# Patient Record
Sex: FEMALE | Race: BLACK OR AFRICAN AMERICAN
[De-identification: names, ages, dates, MRNs, and addresses within clinical notes are randomized per-mention and may not be internally consistent; named-entity substitution may affect disease eponyms.]

---

## 2017-03-22 NOTE — UC
Complaint Female HPI





- HPI Summary


HPI Summary: 


7 DAYS OF DYSURIA, FREQUENCY AND URGENCY. NO FEVER BUT DOES C/O SOME MILD BACK 

PAIN AND NAUSEA. WAS TAKING AZO BUT RAN OUT 2 DAYS AGO. REPORTS FREQUENT UTI - 

EVERY FEW MONTHS. 





ALSO C/O 3 DAYS OF THROBBING HA BEHIND HER EYES WITH PHOTOPHOBIA, PHONOPHOBIA. 

NO VISUAL DISTURBANCES. HAS MIGRAINE HA. USUALLY TAKES TYLENOL OR IBUPROFEN 

WITH GOOD SUCCESS BUT DID NOT HAVE ANY AT HOME. 





- History Of Current Complaint


Chief Complaint: UCGU


Stated Complaint: UTI SYMPTOMS


Time Seen by Provider: 03/22/17 16:42


Hx Obtained From: Patient


Hx Last Menstrual Period: 2/1/17


Onset/Duration: Gradual Onset, Lasting Days, Still Present


Timing: Constant


Severity Initially: Moderate


Severity Currently: Moderate


Pain Intensity: 8


Pain Scale Used: 0-10 Numeric


Character: Burning


Aggravating Factor(s): Urination


Alleviating Factor(s): Nothing


Associated Signs And Symptoms: Positive: Back Pain, Nausea.  Negative: Fever, 

Vaginal Discharge





- Allergies/Home Medications


Allergies/Adverse Reactions: 


 Allergies











Allergy/AdvReac Type Severity Reaction Status Date / Time


 


Penicillins Allergy Unknown Unknown Verified 03/22/17 15:52





   Reaction  





   Details  











Home Medications: 


 Home Medications





Sertraline* [Zoloft*]  03/22/17 [History]











PMH/Surg Hx/FS Hx/Imm Hx





- Additional Past Medical History


Additional PMH: 





SLEEP APNEA


Endocrine History Of: 


   Denies: Diabetes, Thyroid Disease, Hyperthyroidism, Hypothyroidism, 

Dyslipidemia


Cardiovascular History Of: 


   Denies: Cardiac Disorders, Hypertension, Pacemaker/ICD, Myocardial Infarction

, Congestive Heart Failure, Atrial Fibrillation, Deep Vein Thrombosis, Bleeding 

Disorders


Respiratory History Of: 


   Denies: COPD, Asthma, Bronchitis, Pneumonia, Pulmonary Embolism


GI/ History Of: 


   Denies: Gastroesophageal Reflux, Ulcer, Gastrointestinal Bleed, Gall Bladder 

Disease, Kidney Stones, Diverticulitis, Renal Disease, Urosepsis


Neurological History Of: 


   Denies: TIA, CVA, Dementia, Seizures, Migraine


Psychological History Of: Reports: Anxiety, Depression


   Denies: Bipolar Disorder, Schizophrenia, Post Traumatic Stress Disorder


Cancer History Of: 


   Denies: Lung Cancer, Colorectal Cancer, Breast Cancer, Prostate Cancer, 

Cervical Cancer


Other History Of: 


   Negative For: HIV, Hepatitis B, Hepatitis C, Anticoagulant Therapy





- Surgical History


Surgical History: Yes


Surgery Procedure, Year, and Place: Tubal ligation Nov 2014





- Family History


Known Family History: Positive: Diabetes


   Negative: Cardiac Disease, Hypertension





- Social History


Alcohol Use: Occasionally


Substance Use Type: None


Smoking Status (MU): Former Smoker





- Immunization History


Most Recent Influenza Vaccination: 2015





Review of Systems


Constitutional: Negative


Eyes: Photophobia


Respiratory: Negative


Cardiovascular: Negative


Gastrointestinal: Negative


Genitourinary: Dysuria, Frequency, Urgency


Neurological: Headache


All Other Systems Reviewed And Are Negative: Yes





Physical Exam


Triage Information Reviewed: Yes


Appearance: Well-Appearing, No Pain Distress, Well-Nourished


Vital Signs: 


 Initial Vital Signs











Temp  98.2 F   03/22/17 15:47


 


Pulse  73   03/22/17 15:47


 


Resp  12   03/22/17 15:47


 


BP  97/58   03/22/17 15:47


 


Pulse Ox  100   03/22/17 15:47











Vital Signs Reviewed: Yes


Eyes: Positive: Conjunctiva Clear, Other: - PERRL. EOMI


ENT: Positive: Hearing grossly normal


Neck: Positive: Supple, Nontender, No Lymphadenopathy


Respiratory: Positive: No respiratory distress, No accessory muscle use


Cardiovascular: Positive: Pulses Normal


Abdomen Description: Positive: Soft, Other: - MILD SUPRAPUBIC TENDERNESS.  

Negative: CVA Tenderness (R), CVA Tenderness (L), Distended, Guarding


Musculoskeletal: Positive: No Edema


Neurological: Positive: Alert


Psychological: Positive: Age Appropriate Behavior


Skin: Negative: rashes





Diagnostics





- Laboratory


Diagnostic Studies Completed/Ordered: URINE DIP SP. GR 1.015, 1+ LEUKS, TRACE 

BLOOD





 Complaint Female Dx





- Differential Dx/Diagnosis


Provider Diagnoses: 1. UTI.  2. MIGRAINE





Discharge





- Discharge Plan


Condition: Stable


Disposition: HOME


Prescriptions: 


Ibuprofen TAB* [Motrin TAB* 800 MG] 800 mg PO Q8H PRN #30 tab


 PRN Reason: Pain


Sulfamethox/Trimethoprim DS* [Bactrim /160 TAB*] 1 tab PO BID #10 tab


Patient Education Materials:  Urinary Tract Infection in Women (ED), Migraine 

Headache (ED)


Referrals: 


Audie Dawkins MD [Primary Care Provider] - If Needed


Additional Instructions: 


WE HAVE SENT YOUR URINE FOR CULTURE AND WILL CALL YOU IF WE NEED TO CHANGE YOUR 

MEDICATION.





GET ENOUGH REST AND STAY HYDRATED.

## 2017-08-26 NOTE — UC
Complaint Female HPI





- HPI Summary


HPI Summary: 





32 YEAR OLD FEMALE PRESENTS WITH URINARY FREQUENCY, URGENCY AND BACK PAIN. 





- History Of Current Complaint


Chief Complaint: UCGU


Stated Complaint: URINARY ISSUE


Time Seen by Provider: 08/24/17 15:51


Hx Obtained From: Patient


Hx Last Menstrual Period: 08/01/17


Onset/Duration: Sudden Onset


Timing: Constant


Severity Initially: Moderate


Severity Currently: Moderate


Pain Intensity: 4


Pain Scale Used: 0-10 Numeric


Character: Sharp, Burning, Cramping


Associated Signs And Symptoms: Positive: Back Pain





- Allergies/Home Medications


Allergies/Adverse Reactions: 


 Allergies











Allergy/AdvReac Type Severity Reaction Status Date / Time


 


Penicillins Allergy Unknown Unknown Verified 08/24/17 15:23





   Reaction  





   Details  














PMH/Surg Hx/FS Hx/Imm Hx


Other History Of: 


   Negative For: HIV, Hepatitis B, Hepatitis C, Anticoagulant Therapy





- Surgical History


Surgical History: Yes


Surgery Procedure, Year, and Place: Tubal ligation Nov 2014





- Family History


Known Family History: Positive: Diabetes


   Negative: Cardiac Disease, Hypertension





- Social History


Alcohol Use: Occasionally


Substance Use Type: None


Smoking Status (MU): Current Some Day Smoker





- Immunization History


Most Recent Influenza Vaccination: 2015





Review of Systems


Constitutional: Negative


Skin: Negative


Eyes: Negative


ENT: Negative


Respiratory: Negative


Cardiovascular: Negative


Gastrointestinal: Negative


Genitourinary: Dysuria, Frequency, Urgency


Motor: Negative


Neurovascular: Negative


Musculoskeletal: Negative


Neurological: Negative


Psychological: Negative


All Other Systems Reviewed And Are Negative: Yes





Physical Exam


Triage Information Reviewed: Yes


Vital Signs: 


 Initial Vital Signs











Temp  37.2 C   08/24/17 15:18


 


Pulse  75   08/24/17 15:18


 


Resp  18   08/24/17 15:18


 


BP  140/85   08/24/17 15:18


 


Pulse Ox  96   08/24/17 15:18











Eye Exam: Normal


ENT Exam: Normal


Dental Exam: Normal


Neck exam: Normal


Neck: Positive: 1


Respiratory Exam: Normal


Cardiovascular Exam: Normal


Abdominal Exam: Normal


Musculoskeletal Exam: Normal


Neurological Exam: Normal


Psychological Exam: Normal


Skin Exam: Normal





 Complaint Female Dx





- Differential Dx/Diagnosis


Provider Diagnoses: URINARY FREQUENCY.  URINARY URGENCY





Discharge





- Discharge Plan


Condition: Stable


Disposition: HOME


Prescriptions: 


Nitrofurantoin Monohyd Macro [Macrobid] 100 mg PO BID #14 cap


Patient Education Materials:  Urinary Tract Infection in Women (ED)


Referrals: 


Audie Dawkins MD [Primary Care Provider] - If Needed

## 2017-10-30 ENCOUNTER — HOSPITAL ENCOUNTER (EMERGENCY)
Dept: HOSPITAL 25 - ED | Age: 32
Discharge: HOME | End: 2017-10-30
Payer: COMMERCIAL

## 2017-10-30 VITALS — SYSTOLIC BLOOD PRESSURE: 103 MMHG | DIASTOLIC BLOOD PRESSURE: 65 MMHG

## 2017-10-30 DIAGNOSIS — R42: Primary | ICD-10-CM

## 2017-10-30 DIAGNOSIS — R50.9: ICD-10-CM

## 2017-10-30 DIAGNOSIS — Z72.0: ICD-10-CM

## 2017-10-30 DIAGNOSIS — R05: ICD-10-CM

## 2017-10-30 DIAGNOSIS — R11.2: ICD-10-CM

## 2017-10-30 LAB
ALBUMIN SERPL BCG-MCNC: 4.3 G/DL (ref 3.2–5.2)
ALP SERPL-CCNC: 72 U/L (ref 34–104)
ALT SERPL W P-5'-P-CCNC: 18 U/L (ref 7–52)
ANION GAP SERPL CALC-SCNC: 7 MMOL/L (ref 2–11)
AST SERPL-CCNC: 13 U/L (ref 13–39)
BUN SERPL-MCNC: 16 MG/DL (ref 6–24)
BUN/CREAT SERPL: 22.9 (ref 8–20)
CALCIUM SERPL-MCNC: 9.4 MG/DL (ref 8.6–10.3)
CHLORIDE SERPL-SCNC: 104 MMOL/L (ref 101–111)
GLOBULIN SER CALC-MCNC: 3.3 G/DL (ref 2–4)
GLUCOSE SERPL-MCNC: 82 MG/DL (ref 70–100)
HCO3 SERPL-SCNC: 23 MMOL/L (ref 22–32)
HCT VFR BLD AUTO: 42 % (ref 35–47)
HGB BLD-MCNC: 13.5 G/DL (ref 12–16)
LIPASE SERPL-CCNC: 18 U/L (ref 11–82)
MCH RBC QN AUTO: 27 PG (ref 27–31)
MCHC RBC AUTO-ENTMCNC: 33 G/DL (ref 31–36)
MCV RBC AUTO: 83 FL (ref 80–97)
POTASSIUM SERPL-SCNC: 3.8 MMOL/L (ref 3.5–5)
PROT SERPL-MCNC: 7.6 G/DL (ref 6.4–8.9)
RBC # BLD AUTO: 5.03 10^6/UL (ref 4–5.4)
SODIUM SERPL-SCNC: 134 MMOL/L (ref 133–145)
WBC # BLD AUTO: 5.1 10^3/UL (ref 3.5–10.8)

## 2017-10-30 PROCEDURE — 36415 COLL VENOUS BLD VENIPUNCTURE: CPT

## 2017-10-30 PROCEDURE — 99283 EMERGENCY DEPT VISIT LOW MDM: CPT

## 2017-10-30 PROCEDURE — 83605 ASSAY OF LACTIC ACID: CPT

## 2017-10-30 PROCEDURE — 80053 COMPREHEN METABOLIC PANEL: CPT

## 2017-10-30 PROCEDURE — 83690 ASSAY OF LIPASE: CPT

## 2017-10-30 PROCEDURE — 87502 INFLUENZA DNA AMP PROBE: CPT

## 2017-10-30 PROCEDURE — 85025 COMPLETE CBC W/AUTO DIFF WBC: CPT

## 2017-10-30 PROCEDURE — 86140 C-REACTIVE PROTEIN: CPT

## 2017-10-30 PROCEDURE — 96374 THER/PROPH/DIAG INJ IV PUSH: CPT

## 2017-10-30 PROCEDURE — 84702 CHORIONIC GONADOTROPIN TEST: CPT

## 2017-10-30 PROCEDURE — 81003 URINALYSIS AUTO W/O SCOPE: CPT

## 2017-10-30 RX ADMIN — SODIUM CHLORIDE ONE MLS/HR: 900 IRRIGANT IRRIGATION at 11:45

## 2017-10-30 NOTE — ED
Aniyah CAMARGO Edward, scribed for Deandre Chanel MD on 10/30/17 at 1105 .





Dizziness





- HPI Summary


HPI Summary: 





31 y/o female presents to the ED c/o dizziness described as mild room spinning 

and lightheadedness starting a couple of days ago. Pt also c/o eye pain, 

blurred vision, neck pain at the back of the neck, body aches, and photophobia. 

These symptoms started three days ago that worsened yesterday. The HA and 

dizziness are aggravated when she stands up. The pt states there was rhinorrhea 

and a sore throat at first that has resolved. Associated sx: subjective fever 

and chills, ear ache, N/V (1x yesterday), ABD pain described as cramps, cough. 

Pt states that when she coughs she feels something moving in her ABD in the 

periumbilical region. Denies diarrhea. LNMP last month (irregular period). Pt 

states her daughter has a mild cough at home. Sx tubal ligation.





- History Of Current Complaint


Chief Complaint: EDDizziness


Stated Complaint: BODY ACHES/DIZZY


Time Seen by Provider: 10/30/17 11:04


Hx Obtained From: Patient


Onset/Duration: Still Present


Timing: Constant


Severity Initially: Mild


Severity Currently: Moderate


Character: Lightheaded, Dizzy


Aggravating Factor(s): Supine To Erect


Alleviating Factor(s): Nothing


Associated Signs And Symptoms: Positive: Nausea, Vomiting, Unsteady Gait, Fever

, Chills, Other: - blurred vision, eye pain, neck pain, HA, sore throat, 

rhinorrhea, ear pain, ABD pain, cough, photophobia.  Negative: Diarrhea





- Allergies/Home Medications


Allergies/Adverse Reactions: 


 Allergies











Allergy/AdvReac Type Severity Reaction Status Date / Time


 


Penicillins Allergy Unknown Unknown Verified 08/24/17 15:23





   Reaction  





   Details  














PMH/Surg Hx/FS Hx/Imm Hx


Previously Healthy: No


Endocrine/Hematology History: 


   Denies: Hx Anticoagulant Therapy, Hx Diabetes, Hx Thyroid Disease


Cardiovascular History: 


   Denies: Hx Congestive Heart Failure, Hx Deep Vein Thrombosis, Hx Hypertension

, Hx Myocardial Infarction, Hx Pacemaker/ICD


Respiratory History: 


   Denies: Hx Asthma, Hx Chronic Obstructive Pulmonary Disease (COPD), Hx Lung 

Cancer, Hx Pneumonia, Hx Pulmonary Embolism


GI History: 


   Denies: Hx Gall Bladder Disease, Hx Gastrointestinal Bleed, Hx Ulcer, Hx 

Urosepsis


 History: 


   Denies: Hx Kidney Stones, Hx Renal Disease


Neurological History: 


   Denies: Hx Dementia, Hx Migraine, Hx Seizures, Hx Transient Ischemic Attacks 

(TIA)


Psychiatric History: Reports: Hx Anxiety, Hx Depression


   Denies: Hx Schizophrenia, Hx Bipolar Disorder, Hx Substance Abuse





- Surgical History


Surgery Procedure, Year, and Place: Tubal ligation Nov 2014


Infectious Disease History: No


Infectious Disease History: 


   Denies: Hx Clostridium Difficile, Hx Hepatitis, Hx Human Immunodeficiency 

Virus (HIV), Hx of Known/Suspected MRSA, Hx Shingles, Hx Tuberculosis, Hx Known/

Suspected VRE, Hx Known/Suspected VRSA, History Other Infectious Disease, 

Traveled Outside the [place] in Last 30 Days





- Family History


Known Family History: Positive: Diabetes


   Negative: Cardiac Disease, Hypertension





- Social History


Alcohol Use: Occasionally


Substance Use Type: Reports: None


Smoking Status (MU): Current Some Day Smoker





Review of Systems


Positive: Fever, Chills, Other - Body aches


Positive: Photophobia, Blurred Vision, Other - eye pain


ENT: Other - rhinorrhea


Positive: Sore Throat, Ear Ache


Cardiovascular: Negative


Positive: Cough


Positive: Abdominal Pain, Vomiting, Nausea.  Negative: Diarrhea


Genitourinary: Negative


Positive: Myalgia - neck pain


Skin: Negative


Neurological: Other - dizziness


Positive: Headache


Psychological: Normal


All Other Systems Reviewed And Are Negative: Yes





Physical Exam


Triage Information Reviewed: Yes


Vital Signs On Initial Exam: 


 Initial Vitals











Temp Pulse Resp BP Pulse Ox


 


 97.1 F   99   20   115/83   100 


 


 10/30/17 10:00  10/30/17 10:00  10/30/17 10:00  10/30/17 10:00  10/30/17 10:00











Vital Signs Reviewed: Yes


Appearance: Positive: No Pain Distress, Ill-Appearing - mildly


Skin: Positive: Warm, Skin Color Reflects Adequate Perfusion, Dry


Head/Face: Positive: Normal Head/Face Inspection


Eyes: Positive: EOMI, YUDY


ENT: Positive: Normal ENT inspection


Neck: Positive: Supple, Nontender


Respiratory/Lung Sounds: Positive: Clear to Auscultation, Breath Sounds Present


Cardiovascular: Positive: RRR


Abdomen Description: Positive: Nontender, Soft


Bowel Sounds: Positive: Present


Musculoskeletal: Positive: Normal, Strength/ROM Intact


Neurological: Positive: Normal, Sensory/Motor Intact, Alert, Oriented to Person 

Place, Time


Psychiatric: Positive: Affect/Mood Appropriate





Diagnostics





- Vital Signs


 Vital Signs











  Temp Pulse Resp BP Pulse Ox


 


 10/30/17 10:00  97.1 F  99  20  115/83  100














- Laboratory


Lab Results: 


 Lab Results











  10/30/17 10/30/17 10/30/17 Range/Units





  11:32 11:32 11:32 


 


WBC   5.1   (3.5-10.8)  10^3/ul


 


RBC   5.03   (4.0-5.4)  10^6/ul


 


Hgb   13.5   (12.0-16.0)  g/dl


 


Hct   42   (35-47)  %


 


MCV   83   (80-97)  fL


 


MCH   27   (27-31)  pg


 


MCHC   33   (31-36)  g/dl


 


RDW   14   (10.5-15)  %


 


Plt Count   286   (150-450)  10^3/ul


 


MPV   8   (7.4-10.4)  um3


 


Neut % (Auto)   57.4   (38-83)  %


 


Lymph % (Auto)   31.5   (25-47)  %


 


Mono % (Auto)   8.8   (1-9)  %


 


Eos % (Auto)   1.5   (0-6)  %


 


Baso % (Auto)   0.8   (0-2)  %


 


Absolute Neuts (auto)   2.9   (1.5-7.7)  10^3/ul


 


Absolute Lymphs (auto)   1.6   (1.0-4.8)  10^3/ul


 


Absolute Monos (auto)   0.4   (0-0.8)  10^3/ul


 


Absolute Eos (auto)   0.1   (0-0.6)  10^3/ul


 


Absolute Basos (auto)   0   (0-0.2)  10^3/ul


 


Absolute Nucleated RBC   0.01   10^3/ul


 


Nucleated RBC %   0.1   


 


Sodium  134    (133-145)  mmol/L


 


Potassium  3.8    (3.5-5.0)  mmol/L


 


Chloride  104    (101-111)  mmol/L


 


Carbon Dioxide  23    (22-32)  mmol/L


 


Anion Gap  7    (2-11)  mmol/L


 


BUN  16    (6-24)  mg/dL


 


Creatinine  0.70    (0.51-0.95)  mg/dL


 


Est GFR ( Amer)  124.7    (>60)  


 


Est GFR (Non-Af Amer)  97.0    (>60)  


 


BUN/Creatinine Ratio  22.9 H    (8-20)  


 


Glucose  82    ()  mg/dL


 


Lactic Acid    0.7  (0.5-2.0)  mmol/L


 


Calcium  9.4    (8.6-10.3)  mg/dL


 


Total Bilirubin  0.40    (0.2-1.0)  mg/dL


 


AST  13    (13-39)  U/L


 


ALT  18    (7-52)  U/L


 


Alkaline Phosphatase  72    ()  U/L


 


C-Reactive Protein  3.95    (< 5.00)  mg/L


 


Total Protein  7.6    (6.4-8.9)  g/dL


 


Albumin  4.3    (3.2-5.2)  g/dL


 


Globulin  3.3    (2-4)  g/dL


 


Albumin/Globulin Ratio  1.3    (1-3)  


 


Lipase  18    (11.0-82.0)  U/L


 


Beta HCG, Quant  < 0.60    mIU/mL


 


Urine Color     


 


Urine Appearance     


 


Urine pH     (5-9)  


 


Ur Specific Gravity     (1.010-1.030)  


 


Urine Protein     (Negative)  


 


Urine Ketones     (Negative)  


 


Urine Blood     (Negative)  


 


Urine Nitrate     (Negative)  


 


Urine Bilirubin     (Negative)  


 


Urine Urobilinogen     (Negative)  


 


Ur Leukocyte Esterase     (Negative)  


 


Urine Glucose     (Negative)  


 


Influenza A (Rapid)     (Negative)  


 


Influenza B (Rapid)     (Negative)  














  10/30/17 10/30/17 Range/Units





  12:06 13:10 


 


WBC    (3.5-10.8)  10^3/ul


 


RBC    (4.0-5.4)  10^6/ul


 


Hgb    (12.0-16.0)  g/dl


 


Hct    (35-47)  %


 


MCV    (80-97)  fL


 


MCH    (27-31)  pg


 


MCHC    (31-36)  g/dl


 


RDW    (10.5-15)  %


 


Plt Count    (150-450)  10^3/ul


 


MPV    (7.4-10.4)  um3


 


Neut % (Auto)    (38-83)  %


 


Lymph % (Auto)    (25-47)  %


 


Mono % (Auto)    (1-9)  %


 


Eos % (Auto)    (0-6)  %


 


Baso % (Auto)    (0-2)  %


 


Absolute Neuts (auto)    (1.5-7.7)  10^3/ul


 


Absolute Lymphs (auto)    (1.0-4.8)  10^3/ul


 


Absolute Monos (auto)    (0-0.8)  10^3/ul


 


Absolute Eos (auto)    (0-0.6)  10^3/ul


 


Absolute Basos (auto)    (0-0.2)  10^3/ul


 


Absolute Nucleated RBC    10^3/ul


 


Nucleated RBC %    


 


Sodium    (133-145)  mmol/L


 


Potassium    (3.5-5.0)  mmol/L


 


Chloride    (101-111)  mmol/L


 


Carbon Dioxide    (22-32)  mmol/L


 


Anion Gap    (2-11)  mmol/L


 


BUN    (6-24)  mg/dL


 


Creatinine    (0.51-0.95)  mg/dL


 


Est GFR ( Amer)    (>60)  


 


Est GFR (Non-Af Amer)    (>60)  


 


BUN/Creatinine Ratio    (8-20)  


 


Glucose    ()  mg/dL


 


Lactic Acid    (0.5-2.0)  mmol/L


 


Calcium    (8.6-10.3)  mg/dL


 


Total Bilirubin    (0.2-1.0)  mg/dL


 


AST    (13-39)  U/L


 


ALT    (7-52)  U/L


 


Alkaline Phosphatase    ()  U/L


 


C-Reactive Protein    (< 5.00)  mg/L


 


Total Protein    (6.4-8.9)  g/dL


 


Albumin    (3.2-5.2)  g/dL


 


Globulin    (2-4)  g/dL


 


Albumin/Globulin Ratio    (1-3)  


 


Lipase    (11.0-82.0)  U/L


 


Beta HCG, Quant    mIU/mL


 


Urine Color   Yellow  


 


Urine Appearance   Clear  


 


Urine pH   5.0  (5-9)  


 


Ur Specific Gravity   1.026  (1.010-1.030)  


 


Urine Protein   Negative  (Negative)  


 


Urine Ketones   Negative  (Negative)  


 


Urine Blood   Negative  (Negative)  


 


Urine Nitrate   Negative  (Negative)  


 


Urine Bilirubin   Negative  (Negative)  


 


Urine Urobilinogen   Negative  (Negative)  


 


Ur Leukocyte Esterase   Negative  (Negative)  


 


Urine Glucose   Negative  (Negative)  


 


Influenza A (Rapid)  Negative   (Negative)  


 


Influenza B (Rapid)  Negative   (Negative)  











Result Diagrams: 


 10/30/17 11:32





 10/30/17 11:32


Lab Statement: Any lab studies that have been ordered have been reviewed, and 

results considered in the medical decision making process.





Re-Evaluation





- Re-Evaluation


  ** 1


Re-Evaluation Time: 13:10


Change: Improved - Discuss test results and findings.


Comment: Discuss test results and findings. Waiting on urine





  ** 2


Re-Evaluation Time: 14:08


Comment: Discuss plan of care





Dizzy Course/Dx





- Course


Course Of Treatment: IMPROVED IN ED.  DISCUSSED RESULTS WITH PATIENT.  NO 

EVIDENCE OF BACTERIAL INFECTION AT THIS TIME THEREFORE, NO ABX RX NOW.  WILL 

TREAT SYMPTOMS.  F/U WITH PMD; RETURN IF WORSE.





- Diagnoses


Provider Diagnoses: 


 Dizziness, Lightheaded, Nausea








Discharge





- Discharge Plan


Condition: Stable


Disposition: HOME


Prescriptions: 


Ibuprofen TAB* [Motrin TAB* 600 MG] 600 mg PO Q6H PRN #20 tab


 PRN Reason: Pain


Meclizine HCl [Meclizine 25] 25 mg PO Q6H PRN #15 tab


 PRN Reason: Dizziness


Ondansetron ODT TAB* [Zofran 4 MG Odt TAB*] 4 mg PO Q6H PRN #10 tab.odt


 PRN Reason: Nausea


Patient Education Materials:  Dizziness (ED), Lightheadedness (ED), Acute 

Nausea and Vomiting (ED)


Referrals: 


Audie Dawkins MD [Primary Care Provider] - 


Additional Instructions: 


FOLLOW UP WITH YOUR DOCTOR.


RETURN TO THE EMERGENCY DEPARTMENT FOR ANY WORSENING OF YOUR CONDITION OR 

QUESTIONS OR CONCERNS.





The documentation as recorded by the Aniyah shaffer Edward accurately reflects the 

service I personally performed and the decisions made by me, Deandre Chanel MD.

## 2017-11-14 ENCOUNTER — HOSPITAL ENCOUNTER (EMERGENCY)
Dept: HOSPITAL 25 - UCEAST | Age: 32
Discharge: HOME | End: 2017-11-14
Payer: COMMERCIAL

## 2017-11-14 VITALS — SYSTOLIC BLOOD PRESSURE: 104 MMHG | DIASTOLIC BLOOD PRESSURE: 76 MMHG

## 2017-11-14 DIAGNOSIS — Z32.02: ICD-10-CM

## 2017-11-14 DIAGNOSIS — R53.83: ICD-10-CM

## 2017-11-14 DIAGNOSIS — Z88.0: ICD-10-CM

## 2017-11-14 DIAGNOSIS — Z72.0: ICD-10-CM

## 2017-11-14 DIAGNOSIS — N64.4: Primary | ICD-10-CM

## 2017-11-14 DIAGNOSIS — N64.52: ICD-10-CM

## 2017-11-14 PROCEDURE — 85027 COMPLETE CBC AUTOMATED: CPT

## 2017-11-14 PROCEDURE — 84443 ASSAY THYROID STIM HORMONE: CPT

## 2017-11-14 PROCEDURE — 36415 COLL VENOUS BLD VENIPUNCTURE: CPT

## 2017-11-14 PROCEDURE — G0463 HOSPITAL OUTPT CLINIC VISIT: HCPCS

## 2017-11-14 PROCEDURE — 99211 OFF/OP EST MAY X REQ PHY/QHP: CPT

## 2017-11-14 PROCEDURE — 84146 ASSAY OF PROLACTIN: CPT

## 2017-11-14 PROCEDURE — 84702 CHORIONIC GONADOTROPIN TEST: CPT

## 2017-11-14 PROCEDURE — 80053 COMPREHEN METABOLIC PANEL: CPT

## 2017-11-14 PROCEDURE — 81003 URINALYSIS AUTO W/O SCOPE: CPT

## 2017-11-14 NOTE — UC
Cardiac HPI





- HPI Summary


HPI Summary: 


31 y/o female with c/o b/l breast pain, tenderness, with one episode of nipple 

discharge from L breast, small drop of clear fluid x 1 week.   denies pregnancy

, tubal ligation after 3rd child.   Denies vision changes, headache.  + feeling 

tired, weak.  no PMH thyroid disease, endocrine issues,  no prior breast pain/ 

drainage before,   no fever, chills, feeling ill.   no recent surgery/ trauma. 

   + increased pain with shortness of breath on breasts, feels like "pulling" 

sensation.  





- History of Current Complaint


Hx Obtained From: Patient


Hx Last Menstrual Period: tubal


Onset/Duration: Sudden Onset, Lasting Days


Timing: Constant


Pain Intensity: 8


Aggravating Factor(s): Position, Movement


Alleviating Factor(s): Rest, Position


Associated Signs & Symptoms: Positive: Chest Pain





<Samantha Eldridge - Last Filed: 11/15/17 23:32>





<Maria T Calle - Last Filed: 11/16/17 08:23>





- History of Current Complaint


Chief Complaint: UCGeneralIllness


Stated Complaint: BREASTS PAIN


Time Seen by Provider: 11/14/17 16:57





- Allergy/Home Medications


Allergies/Adverse Reactions: 


 Allergies











Allergy/AdvReac Type Severity Reaction Status Date / Time


 


Penicillins Allergy Unknown Unknown Verified 11/14/17 16:10





   Reaction  





   Details  














PMH/Surg Hx/FS Hx/Imm Hx


Previously Healthy: Yes


Other History Of: 


   Negative For: HIV, Hepatitis B, Hepatitis C, Anticoagulant Therapy





- Surgical History


Surgical History: Yes


Surgery Procedure, Year, and Place: Tubal ligation Nov 2014





- Family History


Known Family History: Positive: Diabetes


   Negative: Cardiac Disease, Hypertension





- Social History


Alcohol Use: Occasionally


Substance Use Type: None


Smoking Status (MU): Current Some Day Smoker





- Immunization History


Most Recent Influenza Vaccination: 2015





<Samantha Eldridge - Last Filed: 11/15/17 23:32>





Review of Systems


Constitutional: Fatigue


Skin: Other


Genitourinary: Other - breast pain b/l with nipple discharge from L x 1


Is Patient Immunocompromised?: No


All Other Systems Reviewed And Are Negative: Yes





<Samantha Eldridge - Last Filed: 11/15/17 23:32>





Physical Exam


Triage Information Reviewed: Yes


Appearance: Well-Appearing, No Pain Distress, Well-Nourished


Vital Signs: 


 Initial Vital Signs











Temp  36.3 C   11/14/17 16:11


 


Pulse  87   11/14/17 16:11


 


Resp  18   11/14/17 16:11


 


BP  104/76   11/14/17 16:11


 


Pulse Ox  100   11/14/17 16:11











Vital Signs Reviewed: Yes


Eyes: Positive: Conjunctiva Clear


Neck: Positive: Supple, Nontender, No Lymphadenopathy


Respiratory: Positive: Chest non-tender, Lungs clear, Normal breath sounds, No 

respiratory distress, No accessory muscle use.  Negative: Respiratory distress, 

Decreased breath sounds, Accessory muscle use, Crackles, Rhonchi, Stridor, 

Wheezing, Expiration, Inspiration


Cardiovascular: Positive: RRR, No Murmur, Pulses Normal - upper/ lower radial, 

PT 2+ B/L


Abdomen Description: Positive: Nontender, No Organomegaly, Soft, Bruit.  

Negative: CVA Tenderness (R), CVA Tenderness (L)


Musculoskeletal: Positive: ROM Intact - b/l LEs


Skin Exam: Normal


Skin: Positive: Other - no LAD b/l axillary, breast examination b/l negative 

for masses, lesions, no open wounds, sores, no nipple discharge at baseline or 

with expression for areola.   tenderness to deep palpation of breast tissue b/

l.  no skin changes, no warmth, no induration.





<Samantha Eldridge - Last Filed: 11/15/17 23:32>


Vital Signs: 


 Initial Vital Signs











Temp  97.3 F   11/14/17 16:11


 


Pulse  87   11/14/17 16:11


 


Resp  18   11/14/17 16:11


 


BP  104/76   11/14/17 16:11


 


Pulse Ox  100   11/14/17 16:11














<Maria T Calle - Last Filed: 11/16/17 08:23>





- Assessment/Plan


Course Of Treatment: neg pregnancy blood drawn for prolactin, TSH, cbc, cmp, 

follow up with primary for imaging- mammography within 5-7 days.  jean pierre and 

discussed with DR. Calle.





- Differential Diagnoses - Chest Pain


Differential Diagnosis/HQI/PQRI: Acute MI, Angina, Aortic Aneurysm





- Clinical Impression


Provider Diagnoses: breast pain b/l.





<Samantha Eldridge - Last Filed: 11/15/17 23:32>





Discharge





<Samantha Eldridge - Last Filed: 11/15/17 23:32>





<Marai T Calle - Last Filed: 11/16/17 08:23>





- Discharge Plan


Condition: Good


Disposition: HOME


Patient Education Materials:  Nipple Discharge (ED)


Referrals: 


Audie Dawkins MD [Primary Care Provider] - 


Additional Instructions: 


- Follow up with primary physician- imaging needed to evaluate breast tissue, 

such as mammogram.  Follow up with dr within 7 days 


- Tylenol/ motrin as needed for pain 


- Blood work drawn we will call with abnormalities within 48-72 hours, please 

contact if you would like results. 


- 





ED Addendum


Addendum: 





Asked by EBER to eval pt


Pt presents with progressive bilateral breast pain and burning over 2 weeks. Pt 

states breasts feels full and "bloated" . Pt states feels similar to when was 

pregnant.  Pt states had very small amount clearish/white fliuid from left 

nipple. No recurrence. no bleeding. No skin changes. No redness. Pt states has 

not been wearing bra as breasts are sensitive. No h/o trauma.   No fever, chills

, rash. No ha, vision changes. No n/v/d.  No cp, sob, abd pain. No headache. No 

changes to menstrual cycle. No discharge, itching, odor. No h.o similar no 

other complaints.  No new lotions, product, detergents








A+Ox3, pleasant, NAD


YUDY, EOM intact and full


TM x2 clear 


mmmoist  no exudate


CTA throughout no w/r


RRR nl s1, s2  no m/r/r


abd soft + BS  no guarding no rebound








Pt with pendulous breasts


bilateral symmetric - no dimpling ,retractions of nipple or skin


Pt with dense breast tissue on exam - no masses identified.  No pain with exam


No masses or irregularities palpation to axilla  


no lymphadenopathy b/l axilla





Pt with no concerning exam without acute findings


I had a long discussion with patient regarding breast pain and differential 

causes. 


This includes but is not limited to pregnancy, hormone irregularities,thyroid, 

tumors


Will draw labs today


Pt recommended to follow closely with PCP or gyn - pt has both providers


Pt comfortable and in agreement with plan


questions answered


d/w PA 











<Maria T Calle - Last Filed: 11/16/17 08:23>





Addendum entered and electronically signed by Samantha Eldridge PA  11/15/17 23:

32: 








UC Addendum


Addendum: 





Dx bilateral breast pain, nipple discharge left

## 2017-11-15 LAB
ALBUMIN SERPL BCG-MCNC: 4.4 G/DL (ref 3.2–5.2)
ALP SERPL-CCNC: 70 U/L (ref 34–104)
ALT SERPL W P-5'-P-CCNC: 31 U/L (ref 7–52)
ANION GAP SERPL CALC-SCNC: 12 MMOL/L (ref 2–11)
AST SERPL-CCNC: 19 U/L (ref 13–39)
BUN SERPL-MCNC: 16 MG/DL (ref 6–24)
BUN/CREAT SERPL: 23.5 (ref 8–20)
CALCIUM SERPL-MCNC: 9.4 MG/DL (ref 8.6–10.3)
CHLORIDE SERPL-SCNC: 109 MMOL/L (ref 101–111)
GLOBULIN SER CALC-MCNC: 2.8 G/DL (ref 2–4)
GLUCOSE SERPL-MCNC: 116 MG/DL (ref 70–100)
HCO3 SERPL-SCNC: 18 MMOL/L (ref 22–32)
HCT VFR BLD AUTO: 39 % (ref 35–47)
HGB BLD-MCNC: 12.4 G/DL (ref 12–16)
MCH RBC QN AUTO: 27 PG (ref 27–31)
MCHC RBC AUTO-ENTMCNC: 32 G/DL (ref 31–36)
MCV RBC AUTO: 84 FL (ref 80–97)
POTASSIUM SERPL-SCNC: 4.2 MMOL/L (ref 3.5–5)
PROT SERPL-MCNC: 7.2 G/DL (ref 6.4–8.9)
RBC # BLD AUTO: 4.63 10^6/UL (ref 4–5.4)
SODIUM SERPL-SCNC: 139 MMOL/L (ref 133–145)
TSH SERPL-ACNC: 2.43 MCIU/ML (ref 0.34–5.6)
WBC # BLD AUTO: 6.2 10^3/UL (ref 3.5–10.8)

## 2017-12-07 ENCOUNTER — HOSPITAL ENCOUNTER (EMERGENCY)
Dept: HOSPITAL 25 - ED | Age: 32
LOS: 1 days | Discharge: HOME | End: 2017-12-08
Payer: COMMERCIAL

## 2017-12-07 DIAGNOSIS — Z72.0: ICD-10-CM

## 2017-12-07 DIAGNOSIS — R10.84: ICD-10-CM

## 2017-12-07 DIAGNOSIS — R11.2: ICD-10-CM

## 2017-12-07 DIAGNOSIS — N83.209: Primary | ICD-10-CM

## 2017-12-07 PROCEDURE — 99282 EMERGENCY DEPT VISIT SF MDM: CPT

## 2017-12-07 PROCEDURE — 81003 URINALYSIS AUTO W/O SCOPE: CPT

## 2017-12-07 PROCEDURE — 74176 CT ABD & PELVIS W/O CONTRAST: CPT

## 2017-12-08 VITALS — SYSTOLIC BLOOD PRESSURE: 111 MMHG | DIASTOLIC BLOOD PRESSURE: 51 MMHG

## 2017-12-08 NOTE — RAD
INDICATION: Left flank pain     



COMPARISON: None

 

TECHNIQUE: Noncontrast axial source images were acquired from the level hemidiaphragms to

the symphysis pubis as part of CT imaging for renal stone.



Lung bases: The lung bases are clear.



Liver: The liver is normal in size. Noncontrast imaging shows no evidence of a hepatic

mass or ductal dilatation.



Gallbladder: The gallbladder is partially contracted and there are multiple gallstones.

There is no pericholecystic fluid.



Spleen: The spleen is normal in size. The noncontrast CT appearance is normal.



Pancreas: Noncontrast imaging shows no pancreatic mass or ductal dilitation.



Adrenal glands: No masses are identified.



Kidneys/Bladder: There is no evidence of nephrolithiasis or CT evidence of hydronephrosis.

Noncontrast imaging shows no evidence of a renal mass.   The bladder is unremarkable..



Adenopathy: There is no evidence of intraperitoneal or retroperitoneal adenopathy.

Evaluation is limited without oral contrast.



Fluid collections: There are no free or localized fluid collections.



Vessels: The aorta and iliac vessels are normal in caliber. There are no significant

atherosclerotic changes. The IVC appears normal 



Pelvic organs: The uterus and right adnexa are normal. There is a thick walled 6.1 cm left

adnexal cyst. Suggest follow-up pelvic sonography.



GI tract: Evaluation of the bowel is limited without oral contrast. The stomach, small

bowel, and lower GI tract appear grossly normal. There is ingested material within the

stomach. There are no obstructive findings. The appendix is visualized and appears normal.



Soft tissues: There is a moderate-sized periumbilical hernia containing fat the neck of

the hernia measures 1.6 cm and the hernia sac approximately 5 x 3 cm.



Osseous structures: There are no acute osseous findings.



IMPRESSION:

1.  Partially contracted gallbladder with multiple gallstones.

2.  6.1 cm left adnexal cyst. Suggest follow-up pelvic sonography.

3.  Fat-containing periumbilical hernia.

## 2018-01-04 NOTE — ED
Tomy CAMARGO Alfonso, scribed for Marco Gauthier MD on 12/07/17 at 2351 .





Abdominal Pain/Female





- HPI Summary


HPI Summary: 


 This patient is a 32 year old F presenting to [facility] with a chief complaint of 

left flank pain radiating to stomach since 1 week ago. She visited her PCP 

today, who referred her to the ED to r/o kidney stone and ectopic pregnancy. 

The patient rates the pain 10/10 in severity. Symptoms aggravated by nothing 

Symptoms alleviated by rocking position. Patient reports N/V. Patient denies 

fevers, chills, and hematuria. Her last BM was normal. LMP beginning of last 

month. She had lab work drawn earlier today which showed a negative urinalysis, 

beta HCG, and CBC.





- History of Current Complaint


Chief Complaint: EDFlankPain


Stated Complaint: LT FLANK PAIN


Hx Obtained From: Patient


Hx Last Menstrual Period: tubal


Pregnant?: No


Onset/Duration: Gradual Onset, Lasting Weeks - 1, Still Present


Timing: Constant


Severity Currently: Severe


Pain Intensity: 10


Pain Scale Used: 0-10 Numeric


Location: Flank - L


Radiates: Yes


Radiates to: Other - "stomach"


Aggravating Factor(s): Nothing


Alleviating Factor(s): Other: - "rocking" position


Associated Signs and Symptoms: Positive: Other: - N/V. Patient denies fevers, 

chills, hematuria.


Allergies/Adverse Reactions: 


 Allergies











Allergy/AdvReac Type Severity Reaction Status Date / Time


 


Penicillins Allergy Unknown Unknown Verified 12/07/17 22:49





   Reaction  





   Details  














PMH/Surg Hx/FS Hx/Imm Hx


Endocrine/Hematology History: 


   Denies: Hx Anticoagulant Therapy, Hx Diabetes, Hx Thyroid Disease


Cardiovascular History: 


   Denies: Hx Congestive Heart Failure, Hx Deep Vein Thrombosis, Hx Hypertension

, Hx Myocardial Infarction, Hx Pacemaker/ICD


Respiratory History: 


   Denies: Hx Asthma, Hx Chronic Obstructive Pulmonary Disease (COPD), Hx Lung 

Cancer, Hx Pneumonia, Hx Pulmonary Embolism


GI History: 


   Denies: Hx Gall Bladder Disease, Hx Gastrointestinal Bleed, Hx Ulcer, Hx 

Urosepsis


 History: 


   Denies: Hx Kidney Stones, Hx Renal Disease


Neurological History: 


   Denies: Hx Dementia, Hx Migraine, Hx Seizures, Hx Transient Ischemic Attacks 

(TIA)


Psychiatric History: Reports: Hx Anxiety, Hx Depression


   Denies: Hx Schizophrenia, Hx Bipolar Disorder, Hx Substance Abuse





- Surgical History


Surgery Procedure, Year, and Place: Tubal ligation Nov 2014


Infectious Disease History: No


Infectious Disease History: 


   Denies: Hx Clostridium Difficile, Hx Hepatitis, Hx Human Immunodeficiency 

Virus (HIV), Hx of Known/Suspected MRSA, Hx Shingles, Hx Tuberculosis, Hx Known/

Suspected VRE, Hx Known/Suspected VRSA, History Other Infectious Disease, 

Traveled Outside the [place] in Last 30 Days





- Family History


Known Family History: Positive: Diabetes


   Negative: Cardiac Disease, Hypertension





- Social History


Alcohol Use: Occasionally


Hx Substance Use: No


Substance Use Type: Reports: None


Hx Tobacco Use: Yes


Smoking Status (MU): Current Some Day Smoker





Review of Systems


Negative: Fever, Chills


Positive: Abdominal Pain, Vomiting, Nausea


Negative: hematuria


All Other Systems Reviewed And Are Negative: Yes





Physical Exam





- Summary


Physical Exam Summary: 





Appearance: Well-appearing, Well-nourished, obese, distress but pausing with no 

apparent distress in between.


Skin: Warm, Dry, No rash


Eyes: Normal, PERRL, EOMI, sclera anicteric


ENT: Normal


Neck: Supple, nontender


Respiratory: Clear to auscultation


Cardiovascular: S1, S2, no murmur, no rub, no gallop


Abdomen: Soft, nontender, no organomegaly


Bowel sounds: Present


Musculoskeletal: Normal, Strength/ROM Intact, no edema, pulses symmetrical


Neurological: Normal, A&Ox3, cranial nerves II-XII WNL, follows commands, gait 

not tested, sensation intact to pin and light touch


Psychiatric: affect normal, behavior appropriate, dressed appropriately, 

judgment intact





Triage Information Reviewed: Yes


Vital Signs On Initial Exam: 


 Initial Vitals











Temp Pulse Resp BP Pulse Ox


 


 98.0 F   115   20   127/56   98 


 


 12/07/17 22:44  12/07/17 22:44  12/07/17 22:44  12/07/17 22:44  12/07/17 22:44











Vital Signs Reviewed: Yes





Diagnostics





- Vital Signs


 Vital Signs











  Temp Pulse Resp BP Pulse Ox


 


 12/07/17 22:44  98.0 F  115  20  127/56  98














- Laboratory


Lab Results: 


 Lab Results











  12/07/17 Range/Units





  23:16 


 


Urine Color  Yellow  


 


Urine Appearance  Clear  


 


Urine pH  5.0  (5-9)  


 


Ur Specific Gravity  1.020  (1.010-1.030)  


 


Urine Protein  Negative  (Negative)  


 


Urine Ketones  Negative  (Negative)  


 


Urine Blood  Negative  (Negative)  


 


Urine Nitrate  Negative  (Negative)  


 


Urine Bilirubin  Negative  (Negative)  


 


Urine Urobilinogen  Negative  (Negative)  


 


Ur Leukocyte Esterase  Negative  (Negative)  


 


Urine Glucose  Negative  (Negative)  











Lab Statement: Any lab studies that have been ordered have been reviewed, and 

results considered in the medical decision making process.





- CT


  ** A/P


CT Interpretation Completed By: Radiologist - Negative for right or left 

urinary tract stone or obstruction. Large amount of ingested material in the 

stomach. No bowel obstruction, free air, or free fluid. Negative for 

diverticulitis or colitis. Normal appendix visualized. Ventral/periumbilical 

hernia containing omentum and some fluid but no bowel. There is a 6.2 cm x 3.7 

cm slightly thick walled left adnexal cyst. In order to make sure that it is a 

simple cyst and not complex cyst or other lesion, US may be helpful. Normal 

liver. Positive for cholelithiasis in a contracted gallbladder. ED physician 

has reviewed this radiology report and agrees.





Abdominal Pain Fem Course/Dx





- Course


Course Of Treatment: Patient will be discharged with prescription for Zofran 

and follow up from PCP. The patient is agreeable with this plan.





- Diagnoses


Provider Diagnoses: 


 Ovarian cyst








Discharge





- Discharge Plan


Condition: Good


Disposition: HOME


Prescriptions: 


Ondansetron [Zofran 4 MG Odt] 4 mg PO Q6HR 7 Days #12 tab


Patient Education Materials:  Ovarian Cyst (ED)


Forms:  *Work Release


Referrals: 


Audie Dawkins MD [Primary Care Provider] - 


Additional Instructions: 


follow up with primary regarding dilated stomach and left sided cyst





The documentation as recorded by the Tomy shaffer Alfonso accurately reflects 

the service I personally performed and the decisions made by me, Marco Gauthier MD.

## 2018-03-07 ENCOUNTER — HOSPITAL ENCOUNTER (EMERGENCY)
Dept: HOSPITAL 25 - UCEAST | Age: 33
Discharge: HOME | End: 2018-03-07
Payer: COMMERCIAL

## 2018-03-07 VITALS — DIASTOLIC BLOOD PRESSURE: 99 MMHG | SYSTOLIC BLOOD PRESSURE: 128 MMHG

## 2018-03-07 DIAGNOSIS — B34.9: Primary | ICD-10-CM

## 2018-03-07 DIAGNOSIS — Z88.0: ICD-10-CM

## 2018-03-07 DIAGNOSIS — Z87.891: ICD-10-CM

## 2018-03-07 PROCEDURE — 87502 INFLUENZA DNA AMP PROBE: CPT

## 2018-03-07 PROCEDURE — 99213 OFFICE O/P EST LOW 20 MIN: CPT

## 2018-03-07 PROCEDURE — G0463 HOSPITAL OUTPT CLINIC VISIT: HCPCS

## 2018-03-07 NOTE — UC
FLU HPI





- HPI Summary


HPI Summary: 


4 DAYS OF SUBJECTIVE FEVER, CHILLS, COUGH, DECREASED ENERGY, EAR PAIN, HA, 

NAUSEA AND BODY ACHES. SEVERAL EPISODES LOOSE STOOLS. UTD FLU SHOT. DAUGHTER 

WITH SIMILAR SX.





- History of Current Complaint


Chief Complaint: UCGeneralIllness


Stated Complaint: BODYACHES COUGH EAR PAIN HEADACHES


Time Seen by Provider: 03/07/18 10:55


Hx Obtained From: Patient


Hx Last Menstrual Period: 2/14/18


Onset/Duration: Gradual Onset, Lasting Days, Still Present


Severity Currently: Moderate


Severity Initially: Moderate


Pain Intensity: 10 - appears fatigued but in no acute distress


Pain Scale Used: 0-10 Numeric


Associated Signs & Symptoms: Positive: Fever, Myalgia, Cough, Nasal Congestion, 

Headache, Diarrhea





- Allergy/Home Medications


Allergies/Adverse Reactions: 


 Allergies











Allergy/AdvReac Type Severity Reaction Status Date / Time


 


Penicillins Allergy Unknown Unknown Verified 03/07/18 10:41





   Reaction  





   Details  











Home Medications: 


 Home Medications





Sertraline HCl [Zoloft] 100 mg PO DAILY 03/07/18 [History Confirmed 03/07/18]











PMH/Surg Hx/FS Hx/Imm Hx


Previously Healthy: Yes


Other History Of: 


   Negative For: HIV, Hepatitis B, Hepatitis C, Anticoagulant Therapy





- Surgical History


Surgical History: Yes


Surgery Procedure, Year, and Place: Tubal ligation Nov 2014





- Family History


Known Family History: Positive: Hypertension, Diabetes


   Negative: Cardiac Disease





- Social History


Alcohol Use: Occasionally


Substance Use Type: None


Smoking Status (MU): Former Smoker


Type: Cigarettes





- Immunization History


Most Recent Influenza Vaccination: 2015





Review of Systems


Constitutional: Fever, Chills, Fatigue


ENT: Ear Ache, Nasal Discharge


Respiratory: Cough


Cardiovascular: Negative


Gastrointestinal: Diarrhea


Musculoskeletal: Myalgia


Neurological: Headache


All Other Systems Reviewed And Are Negative: Yes





Physical Exam


Triage Information Reviewed: Yes


Appearance: No Pain Distress, Well-Nourished, Ill-Appearing - mildly


Vital Signs: 


 Initial Vital Signs











Temp  98.7 F   03/07/18 10:43


 


Pulse  77   03/07/18 10:43


 


Resp  16   03/07/18 10:43


 


BP  128/99   03/07/18 10:43


 


Pulse Ox  97   03/07/18 10:43











Vital Signs Reviewed: Yes


Eyes: Positive: Conjunctiva Clear


ENT: Positive: Hearing grossly normal, Pharynx normal


Neck: Positive: Supple, Nontender, No Lymphadenopathy


Respiratory Exam: Normal


Cardiovascular Exam: Normal


Abdomen Description: Positive: Soft


Musculoskeletal: Positive: No Edema


Neurological: Positive: Alert


Psychological: Positive: Normal Response To Family, Age Appropriate Behavior


Skin: Negative: rashes





Diagnostics





- Laboratory


Diagnostic Studies Completed/Ordered: FLU NEG





Flu Course/Dx





- Course


Course Of Treatment: PT REQUESTING TAMIFLU





- Differential Dx/Diagnosis


Provider Diagnoses: ACUTE VIRAL SYNDROME





Discharge





- Discharge Plan


Condition: Stable


Disposition: HOME


Prescriptions: 


Oseltamivir CAP* [Tamiflu CAP*] 75 mg PO BID #10 cap


Patient Education Materials:  Cerumen Impaction (ED), Viral Syndrome (ED)


Forms:  *Gen. Provider Communication


Referrals: 


Audie Dawkins MD [Primary Care Provider] - If Needed


Additional Instructions: 


FLU SWAB NEGATIVE. GIVEN YOUR FLU-LIKE SYMPTOMS WILL COVER WITH TAMIFLU TWICE 

DAILY FOR 5 DAYS. OTC MEDS AS NEEDED FOR FEVER, BODY ACHES. STAY WELL HYDRATED 

AND RESTED. SEEK FOLLOW-UP IF YOU ARE NOT IMPROVING AS EXPECTED.

## 2018-10-09 ENCOUNTER — HOSPITAL ENCOUNTER (EMERGENCY)
Dept: HOSPITAL 25 - ED | Age: 33
Discharge: HOME | End: 2018-10-09
Payer: COMMERCIAL

## 2018-10-09 VITALS — DIASTOLIC BLOOD PRESSURE: 62 MMHG | SYSTOLIC BLOOD PRESSURE: 123 MMHG

## 2018-10-09 DIAGNOSIS — Z87.891: ICD-10-CM
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
## 2018-10-09 DIAGNOSIS — R07.89: Primary | ICD-10-CM
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
## 2018-10-09 DIAGNOSIS — M54.9: ICD-10-CM

## 2018-10-09 DIAGNOSIS — R10.9: ICD-10-CM

## 2018-10-09 LAB
BASOPHILS # BLD AUTO: 0 10^3/UL (ref 0–0.2)
EOSINOPHIL # BLD AUTO: 0 10^3/UL (ref 0–0.6)
HCT VFR BLD AUTO: 40 % (ref 35–47)
HGB BLD-MCNC: 13.1 G/DL (ref 12–16)
LYMPHOCYTES # BLD AUTO: 1.8 10^3/UL (ref 1–4.8)
MCH RBC QN AUTO: 27 PG (ref 27–31)
MCHC RBC AUTO-ENTMCNC: 33 G/DL (ref 31–36)
MCV RBC AUTO: 83 FL (ref 80–97)
MONOCYTES # BLD AUTO: 0.3 10^3/UL (ref 0–0.8)
NEUTROPHILS # BLD AUTO: 3.3 10^3/UL (ref 1.5–7.7)
NRBC # BLD AUTO: 0 10^3/UL
NRBC BLD QL AUTO: 0.2
PLATELET # BLD AUTO: 283 10^3/UL (ref 150–450)
RBC # BLD AUTO: 4.8 10^6/UL (ref 4–5.4)
WBC # BLD AUTO: 5.6 10^3/UL (ref 3.5–10.8)

## 2018-10-09 PROCEDURE — 36415 COLL VENOUS BLD VENIPUNCTURE: CPT

## 2018-10-09 PROCEDURE — 85025 COMPLETE CBC W/AUTO DIFF WBC: CPT

## 2018-10-09 PROCEDURE — 74177 CT ABD & PELVIS W/CONTRAST: CPT

## 2018-10-09 PROCEDURE — 71260 CT THORAX DX C+: CPT

## 2018-10-09 PROCEDURE — 80053 COMPREHEN METABOLIC PANEL: CPT

## 2018-10-09 PROCEDURE — 72110 X-RAY EXAM L-2 SPINE 4/>VWS: CPT

## 2018-10-09 PROCEDURE — 99282 EMERGENCY DEPT VISIT SF MDM: CPT

## 2018-10-09 PROCEDURE — 84702 CHORIONIC GONADOTROPIN TEST: CPT
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
## 2018-10-10 ENCOUNTER — HOSPITAL ENCOUNTER (EMERGENCY)
Dept: HOSPITAL 25 - UCEAST | Age: 33
Discharge: HOME | End: 2018-10-10
Payer: COMMERCIAL

## 2018-10-10 VITALS — DIASTOLIC BLOOD PRESSURE: 69 MMHG | SYSTOLIC BLOOD PRESSURE: 117 MMHG

## 2018-10-10 DIAGNOSIS — Z88.0: ICD-10-CM

## 2018-10-10 DIAGNOSIS — Z87.891: ICD-10-CM

## 2018-10-10 DIAGNOSIS — M54.5: Primary | ICD-10-CM

## 2018-10-10 DIAGNOSIS — F32.9: ICD-10-CM

## 2018-10-10 DIAGNOSIS — R20.0: ICD-10-CM

## 2018-10-10 DIAGNOSIS — F41.9: ICD-10-CM

## 2018-10-10 DIAGNOSIS — M54.6: ICD-10-CM

## 2018-10-10 PROCEDURE — 99212 OFFICE O/P EST SF 10 MIN: CPT

## 2018-10-10 PROCEDURE — G0463 HOSPITAL OUTPT CLINIC VISIT: HCPCS

## 2018-10-10 NOTE — RAD
Indication: Right ankle pain.



3 views of the right ankle demonstrates no fracture. Ankle mortise is intact. Posterior

and inferior calcaneal spurring is noted.



IMPRESSION: No fracture is identified. Posterior and inferior calcaneal spur are noted.



R0
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
## 2018-10-14 ENCOUNTER — HOSPITAL ENCOUNTER (EMERGENCY)
Dept: HOSPITAL 25 - UCEAST | Age: 33
Discharge: HOME | End: 2018-10-14
Payer: COMMERCIAL

## 2018-10-14 VITALS — SYSTOLIC BLOOD PRESSURE: 124 MMHG | DIASTOLIC BLOOD PRESSURE: 64 MMHG

## 2018-10-14 DIAGNOSIS — Z87.891: ICD-10-CM

## 2018-10-14 DIAGNOSIS — S39.012A: Primary | ICD-10-CM

## 2018-10-14 DIAGNOSIS — Z88.0: ICD-10-CM
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
## 2018-10-14 DIAGNOSIS — Z79.891: ICD-10-CM
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
## 2018-10-14 PROCEDURE — G0463 HOSPITAL OUTPT CLINIC VISIT: HCPCS

## 2018-10-14 PROCEDURE — 99212 OFFICE O/P EST SF 10 MIN: CPT
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
## 2019-09-11 ENCOUNTER — HOSPITAL ENCOUNTER (EMERGENCY)
Dept: HOSPITAL 25 - UCEAST | Age: 34
Discharge: HOME | End: 2019-09-11
Payer: COMMERCIAL

## 2019-09-11 VITALS — DIASTOLIC BLOOD PRESSURE: 81 MMHG | SYSTOLIC BLOOD PRESSURE: 125 MMHG

## 2019-09-11 DIAGNOSIS — Z88.0: ICD-10-CM

## 2019-09-11 DIAGNOSIS — N39.0: Primary | ICD-10-CM

## 2019-09-11 DIAGNOSIS — Z87.891: ICD-10-CM

## 2019-09-11 DIAGNOSIS — L30.9: ICD-10-CM

## 2019-09-11 PROCEDURE — G0463 HOSPITAL OUTPT CLINIC VISIT: HCPCS

## 2019-09-11 PROCEDURE — 87077 CULTURE AEROBIC IDENTIFY: CPT

## 2019-09-11 PROCEDURE — 87186 SC STD MICRODIL/AGAR DIL: CPT

## 2019-09-11 PROCEDURE — 99212 OFFICE O/P EST SF 10 MIN: CPT

## 2019-09-11 PROCEDURE — 84702 CHORIONIC GONADOTROPIN TEST: CPT

## 2019-09-11 PROCEDURE — 87086 URINE CULTURE/COLONY COUNT: CPT
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
## 2019-09-11 NOTE — UC
Complaint Female HPI





- HPI Summary


HPI Summary: 


3 DAYS OF DYSURIA, FREQUENCY AND URGENCY.  FEELS LIKE UTI SYMPTOMS.  NO FEVER, 

BACK PAIN, NAUSEA.  ALSO COMPLAINING OF SEVERAL WEEKS OF VAGINAL BURNING, 

IRRITATION AND WHITE DISCHARGE.  STATES SHE WAS SEEN BY HER GYNECOLOGIST 2 

WEEKS AGO AND TREATED FOR A YEAST INFECTION WITH DIFLUCAN X 3 DAYS AND VAGINAL 

CREAM.  SHE STATES THE SYMPTOMS ARE NOT IMPROVING.





- History Of Current Complaint


Chief Complaint: UCGU


Stated Complaint: ABD PAIN, SKIN BREAKOUT


Time Seen by Provider: 09/11/19 20:16


Hx Obtained From: Patient


Hx Last Menstrual Period: 082619


Onset/Duration: Gradual Onset, Lasting Days, Still Present


Timing: Constant


Severity Initially: Moderate


Severity Currently: Moderate


Pain Intensity: 10


Pain Scale Used: 0-10 Numeric


Character: Burning


Aggravating Factor(s): Urination


Alleviating Factor(s): Nothing


Associated Signs And Symptoms: Positive: Vaginal Discharge.  Negative: Fever, 

Back Pain





- Allergies/Home Medications


Allergies/Adverse Reactions: 


 Allergies











Allergy/AdvReac Type Severity Reaction Status Date / Time


 


Penicillins Allergy Unknown Unknown Verified 09/11/19 18:55





   Reaction  





   Details  














PMH/Surg Hx/FS Hx/Imm Hx


Previously Healthy: Yes


Other History Of: 


   Negative For: HIV, Hepatitis B, Hepatitis C, Anticoagulant Therapy





- Surgical History


Surgical History: Yes


Surgery Procedure, Year, and Place: Tubal ligation Nov 2014





- Family History


Known Family History: Positive: Hypertension, Diabetes


   Negative: Cardiac Disease





- Social History


Alcohol Use: Occasionally


Substance Use Type: None


Substance Use Comment - Amount & Last Used: was on prescribed pain med-ran out


Smoking Status (MU): Former Smoker


Type: Cigarettes





- Immunization History


Most Recent Influenza Vaccination: 2015





Review of Systems


All Other Systems Reviewed And Are Negative: Yes


Constitutional: Positive: Negative


Respiratory: Positive: Negative


Cardiovascular: Positive: Negative


Gastrointestinal: Positive: Negative


Genitourinary: Positive: Dysuria, Frequency, Urgency, Vaginal/Penile Burning, 

Vaginal/Penile Discharge





Physical Exam


Triage Information Reviewed: Yes


Appearance: Well-Appearing, No Pain Distress, Well-Nourished


Vital Signs: 


 Initial Vital Signs











Temp  97.6 F   09/11/19 18:50


 


Pulse  72   09/11/19 18:50


 


Resp  18   09/11/19 18:50


 


BP  122/83   09/11/19 18:50


 


Pulse Ox  100   09/11/19 18:50








 Laboratory Tests











  09/11/19





  19:47


 


POC Ur Pregnancy Test  Negative











Vital Signs Reviewed: Yes


Eyes: Positive: Conjunctiva Clear


ENT: Positive: Hearing grossly normal


Neck: Positive: Supple


Respiratory: Positive: No respiratory distress, No accessory muscle use


Cardiovascular: Positive: Pulses Normal


Abdomen Description: Positive: Soft


Musculoskeletal: Positive: No Edema


Neurological: Positive: Alert


Psychological: Positive: Age Appropriate Behavior


Skin: Negative: Rashes





 Complaint Female Dx





- Course


Course Of Treatment: 





WILL TREAT PATIENT EMPIRICALLY FOR UTI BASED ON HER SYMPTOMS.  URINE HAS BEEN 

SENT FOR CULTURE.  DISCUSSED REPEAT PELVIC EXAM AND SWABS FOR VAGINITIS.  

PATIENT STATES SHE WILL FOLLOW-UP WITH HER GYNECOLOGIST FOR THIS AND DECLINES 

PELVIC EXAM TODAY.  SHE HAS ALSO HAD A DIFFUSE ITCHY RASH FOR SEVERAL MONTHS.  

WILL REFER TO DERMATOLOGY.





- Differential Dx/Diagnosis


Provider Diagnosis: 


 UTI (urinary tract infection), Dermatitis








Discharge ED





- Sign-Out/Discharge


Documenting (check all that apply): Patient Departure


All imaging exams completed and their final reports reviewed: No Studies





- Discharge Plan


Condition: Stable


Disposition: HOME


Prescriptions: 


Phenazopyridine TAB* [Pyridium TAB*] 200 mg PO TID #6 tab


Sulfamethox/Trimethoprim DS* [Bactrim /160 TAB*] 1 tab PO BID #9 tab


Patient Education Materials:  Urinary Tract Infection in Women (ED), Dermatitis 

(ED)


Referrals: 


Avery Mendoza, NP [Primary Care Provider] - 


Additional Instructions: 


WILL TREAT FOR PRESUMPTIVE UTI BASED ON YOUR SYMPTOMS.  TAKE THE ANTIBIOTIC 

TWICE DAILY FOR THE FULL 5 DAYS.  YOUR URINE HAS BEEN SENT FOR CULTURE AND WE 

WILL CALL YOU IF YOUR MEDICATION NEEDS TO BE CHANGED.  





FOLLOW UP WITH YOUR GYNECOLOGIST TO FURTHER DISCUSS YOUR PERSISTENT VAGINITIS.





CALL DERMATOLOGY TO SCHEDULE AN APPOINTMENT FOR EVALUATION OF YOUR RASH.





DERMATOLOGY IN [place]





DR. JOSEPH SUH (DOES NOT SEE PTS ON MONDAYS OR TUESDAYS)


[facility], [facility]


 [address]; [address]


 [place] [address] 


Phone: [phone] 





Dr. Gabrielle Goodwin


Address: [address] 


[place] [address]


Phone: [phone]





DR. JOSESITO MODI, DR. DEBBIE SANCHEZ


[facility]


[address], [address]


 [place] [address] [phone] 





DERMATOLOGY IN [place]


Dr. Felisha Mejia


Phone: [phone]





DERMATOLOGY IN [place]


DR. JOSESITO MODI [phone]








- Billing Disposition and Condition


Condition: STABLE


Disposition: Home